# Patient Record
Sex: FEMALE | Race: BLACK OR AFRICAN AMERICAN | NOT HISPANIC OR LATINO | URBAN - METROPOLITAN AREA
[De-identification: names, ages, dates, MRNs, and addresses within clinical notes are randomized per-mention and may not be internally consistent; named-entity substitution may affect disease eponyms.]

---

## 2021-04-14 DIAGNOSIS — Z23 ENCOUNTER FOR IMMUNIZATION: ICD-10-CM

## 2023-10-20 ENCOUNTER — TELEPHONE (OUTPATIENT)
Dept: ADMISSIONS | Facility: REHABILITATION | Age: 73
End: 2023-10-20

## 2023-10-31 ENCOUNTER — TELEPHONE (OUTPATIENT)
Dept: ADMISSIONS | Facility: REHABILITATION | Age: 73
End: 2023-10-31

## 2023-11-02 ENCOUNTER — TELEPHONE (OUTPATIENT)
Dept: ADMISSIONS | Facility: REHABILITATION | Age: 73
End: 2023-11-02

## 2024-03-15 ENCOUNTER — HOSPITAL ENCOUNTER (OUTPATIENT)
Dept: REHABILITATION | Facility: REHABILITATION | Age: 74
Setting detail: THERAPIES SERIES
Discharge: HOME | End: 2024-03-15
Attending: PHYSICAL MEDICINE & REHABILITATION

## 2024-03-15 DIAGNOSIS — R53.81 DEBILITY, UNSPECIFIED: Primary | ICD-10-CM

## 2024-03-15 PROCEDURE — 99000064 HC PRE-DRIVER EVAL 120 MIN

## 2024-03-15 PROCEDURE — 99000065 HC VEHICLE EVAL 120 MIN

## 2024-03-15 RX ORDER — OMEPRAZOLE 40 MG/1
40 CAPSULE, DELAYED RELEASE ORAL
COMMUNITY
Start: 2023-09-22

## 2024-03-15 RX ORDER — FLUTICASONE PROPIONATE AND SALMETEROL 500; 50 UG/1; UG/1
POWDER RESPIRATORY (INHALATION) SEE ADMIN INSTRUCTIONS
COMMUNITY
Start: 2024-03-07

## 2024-03-15 RX ORDER — SOLIFENACIN SUCCINATE 5 MG/1
5 TABLET, FILM COATED ORAL DAILY
COMMUNITY
Start: 2023-09-22 | End: 2024-09-21

## 2024-03-15 RX ORDER — ESTRADIOL 0.1 MG/G
CREAM VAGINAL
COMMUNITY
Start: 2024-03-07

## 2024-03-15 RX ORDER — MINOXIDIL 2.5 MG/1
2.5 TABLET ORAL DAILY
COMMUNITY
Start: 2024-03-07

## 2024-03-15 RX ORDER — HYDROCHLOROTHIAZIDE 25 MG/1
25 TABLET ORAL DAILY
COMMUNITY
Start: 2024-03-07

## 2024-03-15 RX ORDER — CLOBETASOL PROPIONATE 0.5 MG/G
OINTMENT TOPICAL
COMMUNITY
Start: 2024-03-13

## 2024-03-15 RX ORDER — PREGABALIN 100 MG/1
100 CAPSULE ORAL 2 TIMES DAILY
COMMUNITY
Start: 2023-09-22

## 2024-03-15 RX ORDER — ACETAMINOPHEN 500 MG
2000 TABLET ORAL
COMMUNITY

## 2024-03-15 RX ORDER — BUSPIRONE HYDROCHLORIDE 5 MG/1
5 TABLET ORAL 3 TIMES DAILY
COMMUNITY
Start: 2023-09-22 | End: 2024-09-21

## 2024-03-15 RX ORDER — AMLODIPINE BESYLATE 5 MG/1
5 TABLET ORAL NIGHTLY
COMMUNITY
Start: 2023-09-22

## 2024-03-15 RX ORDER — VALSARTAN 160 MG/1
160 TABLET ORAL DAILY
COMMUNITY
Start: 2023-09-22

## 2024-03-15 RX ORDER — CETIRIZINE HYDROCHLORIDE 10 MG/1
TABLET ORAL
COMMUNITY

## 2024-03-15 RX ORDER — FLUTICASONE PROPIONATE AND SALMETEROL 250; 50 UG/1; UG/1
POWDER RESPIRATORY (INHALATION)
COMMUNITY

## 2024-03-15 ASSESSMENT — MONTREAL COGNITIVE ASSESSMENT (MOCA): WHAT IS THE TOTAL SCORE (OUT OF 30): NT
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
## 2024-04-01 ENCOUNTER — TELEPHONE (OUTPATIENT)
Dept: REHABILITATION | Facility: REHABILITATION | Age: 74
End: 2024-04-01

## 2024-04-03 ENCOUNTER — DOCUMENTATION (OUTPATIENT)
Dept: REHABILITATION | Facility: REHABILITATION | Age: 74
End: 2024-04-03

## 2024-04-03 NOTE — PROGRESS NOTES
"[facility]   REHAB PROGRAM  DOCUMENTATION ENCOUNTER    DATE:  April 3, 2024    NAME:  Sola Little : 1950 AGE: 73 y.o. MRN: [id number]    SUBJECTIVE:  \"I was very disappointed with my performance using the hand controls and was wondering if I could use my left leg instead?\"    OBJECTIVE:  Comprehensive eval completed 3/15/24 with initial recommendation for pursuit of hand control training on trial basis. Client presenting with limited LE functioning on clinical assessment, for which further assessment of LLE was not completed. Client reporting interim research of alternatives, including use of LFA in lieu of hand controls. Client further reporting purchase of study materials to improve her knowledge of road rules/ traffic signs.    ASSESSMENT:  In light of limited assessment of LLE and questionable adapted driving potential, client would be an appropriate candidate for partial reassessment to include thorough examination of her LLE functioning and retesting of road knowledge in consideration of alternative options.    PLAN:  Reassessment plan proposed to client with client verbalizing understanding and agreement. Reassessment planned for 2024. Case remains on hold pending results of reassessment.      Elfego Snell MS, OTR/L, CDRS, CDI  Occupational Therapist  Certified  Rehabilitation Specialist, Certified   "

## 2024-05-01 ENCOUNTER — HOSPITAL ENCOUNTER (OUTPATIENT)
Dept: REHABILITATION | Facility: REHABILITATION | Age: 74
Setting detail: THERAPIES SERIES
Discharge: HOME | End: 2024-05-01
Attending: PHYSICAL MEDICINE & REHABILITATION

## 2024-05-01 DIAGNOSIS — R53.81 DEBILITY, UNSPECIFIED: Primary | ICD-10-CM

## 2024-05-01 PROCEDURE — 99000018 HC VEHICLE LESSON 60 MIN

## 2024-05-01 NOTE — LETTER
May 1, 2024    Brodie Mariee MD  [address] [facility] [address] [place] [address]    Re: Sola Little  : 1950  MRN: [id number]    Dear Dr. Mariee,    Thank you for referring Sola Little to our  Rehabilitation Program.  She was seen in follow-up on May 1, 2024, for completion of a partial re-assessment in consideration of alternative adapted driving options following challenged performance during baseline testing. Please see the attached report for specific details of the evaluation.  While she appears to be appropriate for consideration of adapted pedal use using her left lower extremity, she continues to demonstrate restricted knowledge of road rules for which remediation remains necessary. Initiation of adapted  training on hold at this time pending demonstration of sufficient knowledge of road rules. The results were discussed with Sola Little, who was instructed not to drive and advised on continued self-study of the PA 's Manual. Client advised on return criteria for formal reassessment of her readiness for initiation of adapted  training, to which she verbalized understanding. Her case remains on hold at this time.    Please feel free to call me at [phone] if I can be of further assistance.  Thank you very much for your referral.      Sincerely,      Elfego Snell, MS, OTR/L, CDRS, CDI  Occupational Therapist  Certified  Rehabilitation Specialist, Certified       Attachment(s): Driving Evaluation Report          [facility]   REHABILITATION PROGRAM  CLINICAL PRE-DRIVING RISK ASSESSMENT    SESSION DATE: May 1, 2024    NAME:  Sola Little : 1950 AGE: 73 y.o.  MRN: [id number]    ADDRESS:  [address] [place], [place] [address] [address]    BEST PHONE NUMBER:  [phone] (cell)    REFERRING PROVIDER:    Brodie Mariee MD  [address] [facility] [address] [place] [address]      DATE REFERRED: 2023  FUNDING  "SOURCE: Self-pay    BACKGROUND/ HISTORY     Mrs. 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 She presents unaccompanied for today's follow-up session to continue to explore use of her lower extremities for adapted pedal operation in lieu of recommended hand control training after observed challenges with initial hand control trial and expressed concerns overcoming learning curve. Results of baseline testing were further concerning for impairments in visuomotor reaction speed, visual-perceptual skills, traffic sign knowledge, and cognition. Results of adapted behind-the-wheel testing (parking lot only) were notably concerning for difficulty adapting to use of hand controls, for which driving potential appeared guarded. Client was advised on need for remediation of road rule knowledge in conjunction with trial of adapted driving instruction to continue to explore her driving potential, though client subsequently contacted DRP for consideration of a left-foot accelerator, reporting perception of inability to learn hand controls. Client further reporting purchase of study materials to improve her knowledge of road rules/ traffic signs as advised. In light of limited assessment of client's left lower extremity during baseline testing, client was advised on return for continued evaluation of her adapted driving potential, for which she now returns to complete a partial re-assessment.       LICENSE/ PERMIT STATUS  Valid License/ Permit: [place]  [place]   #: [id number]  Expiration Date: 06/07/27  Class: C  Restrictions: None  Endorsements: None  Reported to DMV: No  Restoration Requirements: None        SUBJECTIVE    \"I'm here to look at the use of my legs... well, one leg.\"    OBJECTIVE    Client presenting for follow-up clinical " testing in consideration of additional adapted driving alternatives to hand controls, requesting specifically for consideration of use of her left lower extremity for pedal operation.    Lower Extremity Function       LLE RLE   ROM       HIP       KNEE       ANKLE    WFL  WFL  BNL; ~50% dorsiflexion    WFL  WFL  BNL; limited dorsiflexion   STRENGTH       HIP           Flexion           Extension       KNEE           Flexion           Extension       ANKLE           Dorsiflexion           Plantarflexion       (5/5) normal  (5/5) normal     (5/5) normal  (5/5) normal     (5/5) normal (w/i available range)  (5/5) normal       (5/5) normal  (5/5) normal    (5/5) normal  (5/5) normal     (1+/5) trace plus - BNL  (5/5) normal   MUSCLE TONE NT NT   COORDINATION    Grossly WFL  Foot placement observed to be largely consistent via whole-leg movements IMPAIRED  Inconsistent distal control d/t weakness/ sensory impairment    SENSATION  via [place] 5.07 monofilament ABNORMAL  Inconsistent responses at great toe, however, protective sensation intact elsewhere throughout the foot; proprioception at great toe intact IMPAIRED  Inconsistent protective sensation at great toe, ball of foot, and heel; latent responses throughout foot; proprioception at great toe grossly intact        Simple Brake Reaction Time (SBRT)     Extremity LLE RLE   Range 0.525-0.675 sec 0.616-0.859 sec   Average reaction time     IMPAIRED > 0.75 sec 0.63 sec 0.73 sec   Norms for Age/Sex     75th Percentile is .49 sec     50th Percentile is .57 sec     25th Percentile is .64 sec 25th %-ile for age/sex  (WNL)    10-15th %-ile for women overall (BNL) 10-15th %-ile for age/ sex  (BNL)    <5th %-ile for women overall (BNL)   Pedal Accuracy MARGINAL  Client observed with hesitations related to novel use of LLE, including slowed performance for 1 trial of pivot on heel, though without overt pedal errors (performance largely expected of novel LE use)  IMPAIRED  Client observed with occasional dual-pedal contact during warm-up trials with single error during formal test trials; completes via whole-leg movements without active ankle control   Comments: Completed via alternative pedal-setup in simulation of left-foot accelerator Completed via traditional pedal orientation      Knowledge of Road Rules/ PA  Practice Test Camilla      15-18    PASS/ WFL        0-14    FAIL 12/18  (67%--FAIL)    IMPRESSION:  IMPAIRED KNOWLEDGE OF ROAD                            RULES / TRAFFIC SIGN RECALL      ASSESSMENT    Mrs. Sola Little is a 73 y.o. 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  Results of continued pre-driving assessment were notable for fair potential for consideration of adapted pedal use via the left-lower extremity despite mild sensory impairment, though with continued concerns with baseline knowledge of road rules for which remediation remains necessary.  While visuomotor reaction speed via the left lower extremity was observed to be in the low-normal range for client's age/sex, her performance nonetheless remains below normal limits for women overall.  As she has otherwise never attempted use of her left lower extremity for functional pedal use, there remains potential for improvement given adequate adapted  training.  Client specifically reporting on conscious need for attention to the directional control of her leg when completing pedal transitions, which is largely consistent with novel device use.  In light of prior cognitive testing suggestive of mild cognitive impairment, however, it remains to be seen if Mrs. Little can regain adequate knowledge of road rules.  Performance on mock knowledge testing this day remained concerning for inadequate knowledge of applied road rules, traffic signs/ signals, and  "road markings.  Overall, while test results are potentially supportive of pursuing adapted  training in use of a left-foot accelerator from a physical/ sensory perspective, client's present inability to demonstrate sufficient knowledge of road rules remains prohibitive to driving and/or pursuit of adapted  training at this time.    PLAN/ RECOMMENDATIONS    Results were reviewed with client with recommendation for continued self-study of the PA 's Manual in order to regain sufficient knowledge of road rules. Client was specifically educated on how to access a digital copy of the PA 's Manual and was assisted in downloading the state-developed practice test lalit for self-assessment of her readiness. Client was explicitly advised on goal of passing performance on practice testing (15+/18) on 10 consecutive attempts in consideration of return to [facility] for formal re-assessment prior to initiating trial of adapted  training in use of a left-foot accelerator. Client expressing understanding and agreement with plan, reporting, \"I hope that learning to use the left leg will be easier than my hands.\" Client was again advised against all driving in the interim, to which she verbalized a continued commitment to driving cessation. Case remains on hold pending self-report of readiness for formal reassessment.      Elfego Snell MS, OTR/L, CDRS, CDI  Occupational Therapist  Certified  Rehabilitation Specialist, Certified     Time In: 1108  Time Out: 1200  Total Time (minutes): 52 min  " home

## 2024-05-01 NOTE — PROGRESS NOTES
[facility]   REHABILITATION PROGRAM  CLINICAL PRE-DRIVING RISK ASSESSMENT    SESSION DATE: May 1, 2024    NAME:  Sola Little : 1950 AGE: 73 y.o. MRN: [id number]    ADDRESS:  [address] [place] [address]    BEST PHONE NUMBER:  [phone] (cell)    REFERRING PROVIDER:    Brodie Mariee MD  [address] [facility] [address]   [place] [address]      DATE REFERRED: 2023  FUNDING SOURCE: Self-pay    BACKGROUND/ HISTORY     Mrs. 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 She presents unaccompanied for today's follow-up session to continue to explore use of her lower extremities for adapted pedal operation in lieu of recommended hand control training after observed challenges with initial hand control trial and expressed concerns overcoming learning curve. Results of baseline testing were further concerning for impairments in visuomotor reaction speed, visual-perceptual skills, traffic sign knowledge, and cognition. Results of adapted behind-the-wheel testing (parking lot only) were notably concerning for difficulty adapting to use of hand controls, for which driving potential appeared guarded. Client was advised on need for remediation of road rule knowledge in conjunction with trial of adapted driving instruction to continue to explore her driving potential, though client subsequently contacted [facility]P for consideration of a left-foot accelerator, reporting perception of inability to learn hand controls. Client further reporting purchase of study materials to improve her knowledge of road rules/ traffic signs as advised. In light of limited assessment of client's left lower extremity during baseline testing, client was advised on return for continued evaluation of her adapted driving potential, for  "which she now returns to complete a partial re-assessment.       LICENSE/ PERMIT STATUS  Valid License/ Permit: [place]  [place]   #: [id number]  Expiration Date: 06/07/27  Class: C  Restrictions: None  Endorsements: None  Reported to DMV: No  Restoration Requirements: None      SUBJECTIVE    \"I'm here to look at the use of my legs... well, one leg.\"    OBJECTIVE    Client presenting for follow-up clinical testing in consideration of additional adapted driving alternatives to hand controls, requesting specifically for consideration of use of her left lower extremity for pedal operation.    Lower Extremity Function       LLE RLE   ROM       HIP       KNEE       ANKLE    WFL  WFL  BNL; ~50% dorsiflexion    WFL  WFL  BNL; limited dorsiflexion   STRENGTH       HIP           Flexion           Extension       KNEE           Flexion           Extension       ANKLE           Dorsiflexion           Plantarflexion       (5/5) normal  (5/5) normal     (5/5) normal  (5/5) normal     (5/5) normal (w/i available range)  (5/5) normal       (5/5) normal  (5/5) normal    (5/5) normal  (5/5) normal     (1+/5) trace plus - BNL  (5/5) normal   MUSCLE TONE NT NT   COORDINATION    Grossly WFL  Foot placement observed to be largely consistent via whole-leg movements IMPAIRED  Inconsistent distal control d/t weakness/ sensory impairment    SENSATION  via [place] 5.07 monofilament ABNORMAL  Inconsistent responses at great toe, however, protective sensation intact elsewhere throughout the foot; proprioception at great toe intact IMPAIRED  Inconsistent protective sensation at great toe, ball of foot, and heel; latent responses throughout foot; proprioception at great toe grossly intact        Simple Brake Reaction Time (SBRT)     Extremity LLE RLE   Range 0.525-0.675 sec 0.616-0.859 sec   Average reaction time     IMPAIRED > 0.75 sec 0.63 sec 0.73 sec   Norms for Age/Sex     75th Percentile is .49 sec     50th Percentile is .57 " sec     25th Percentile is .64 sec 25th %-ile for age/sex  (WNL)    10-15th %-ile for women overall (BNL) 10-15th %-ile for age/ sex  (BNL)    <5th %-ile for women overall (BNL)   Pedal Accuracy MARGINAL  Client observed with hesitations related to novel use of LLE, including slowed performance for 1 trial of pivot on heel, though without overt pedal errors (performance largely expected of novel LE use) IMPAIRED  Client observed with occasional dual-pedal contact during warm-up trials with single error during formal test trials; completes via whole-leg movements without active ankle control   Comments: Completed via alternative pedal-setup in simulation of left-foot accelerator Completed via traditional pedal orientation        Knowledge of Road Rules/ PA  Practice Test Camilla      15-18    PASS/ WFL        0-14    FAIL 12/18  (67%--FAIL)    IMPRESSION:  IMPAIRED KNOWLEDGE OF ROAD                            RULES / TRAFFIC SIGN RECALL        ASSESSMENT    Mrs. Sola Little is a 73 y.o. 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  Results of continued pre-driving assessment were notable for fair potential for consideration of adapted pedal use via the left-lower extremity despite mild sensory impairment, though with continued concerns with baseline knowledge of road rules for which remediation remains necessary.  While visuomotor reaction speed via the left lower extremity was observed to be in the low-normal range for client's age/sex, her performance nonetheless remains below normal limits for women overall.  As she has otherwise never attempted use of her left lower extremity for functional pedal use, there remains potential for improvement given adequate adapted  training.  Client specifically reporting on conscious need for attention to the directional control  "of her leg when completing pedal transitions, which is largely consistent with novel device use.  In light of prior cognitive testing suggestive of mild cognitive impairment, however, it remains to be seen if Mrs. Little can regain adequate knowledge of road rules.  Performance on mock knowledge testing this day remained concerning for inadequate knowledge of applied road rules, traffic signs/ signals, and road markings.  Overall, while test results are potentially supportive of pursuing adapted  training in use of a left-foot accelerator from a physical/ sensory perspective, client's present inability to demonstrate sufficient knowledge of road rules remains prohibitive to driving and/or pursuit of adapted  training at this time.      PLAN/ RECOMMENDATIONS    Results were reviewed with client with recommendation for continued self-study of the PA 's Manual in order to regain sufficient knowledge of road rules. Client was specifically educated on how to access a digital copy of the PA 's Manual and was assisted in downloading the state-developed practice test lalit for self-assessment of her readiness. Client was explicitly advised on goal of passing performance on practice testing (15+/18) on 10 consecutive attempts in consideration of return to [facility] for formal re-assessment prior to initiating trial of adapted  training in use of a left-foot accelerator. Client expressing understanding and agreement with plan, reporting, \"I hope that learning to use the left leg will be easier than my hands.\" Client was again advised against all driving in the interim, to which she verbalized a continued commitment to driving cessation. Case remains on hold pending self-report of readiness for formal reassessment.    NEXT LESSON:  TBD  DMV TEST DATE:  TBD    Elfego Snell MS, OTR/L, CDRS, CDI  Occupational Therapist  Certified  Rehabilitation Specialist, Certified     Time In: " 1108  Time Out: 1200  Total Time (minutes): 52 min